# Patient Record
Sex: FEMALE | Race: OTHER | URBAN - METROPOLITAN AREA
[De-identification: names, ages, dates, MRNs, and addresses within clinical notes are randomized per-mention and may not be internally consistent; named-entity substitution may affect disease eponyms.]

---

## 2021-08-02 ENCOUNTER — WEB ENCOUNTER (OUTPATIENT)
Dept: URBAN - METROPOLITAN AREA CLINIC 118 | Facility: CLINIC | Age: 56
End: 2021-08-02

## 2021-08-04 ENCOUNTER — OFFICE VISIT (OUTPATIENT)
Dept: URBAN - METROPOLITAN AREA CLINIC 118 | Facility: CLINIC | Age: 56
End: 2021-08-04
Payer: OTHER GOVERNMENT

## 2021-08-04 ENCOUNTER — LAB OUTSIDE AN ENCOUNTER (OUTPATIENT)
Dept: URBAN - METROPOLITAN AREA CLINIC 118 | Facility: CLINIC | Age: 56
End: 2021-08-04

## 2021-08-04 DIAGNOSIS — K57.90 DIVERTICULOSIS: ICD-10-CM

## 2021-08-04 DIAGNOSIS — R10.32 LLQ ABDOMINAL PAIN: ICD-10-CM

## 2021-08-04 PROCEDURE — 99214 OFFICE O/P EST MOD 30 MIN: CPT | Performed by: INTERNAL MEDICINE

## 2021-08-04 NOTE — PHYSICAL EXAM GASTROINTESTINAL
Abdomen , soft, mild LLQ tenderness, nondistended , no guarding or rigidity , no masses palpable , normal bowel sounds , Liver and Spleen , no hepatomegaly present , no hepatosplenomegaly , liver nontender , spleen not palpable

## 2021-08-04 NOTE — HPI-TODAY'S VISIT:
The patient is referred back to our clinic by Dr. Medrano's office for left lower quadrant pain and a history of diverticular disease.  She had an uncomplicated colonoscopy in 2017 that showed mild sigmoid diverticulosis.  She has had intermittent left lower quadrant discomfort for at least the last 5 years and has been treated multiple times with oral antibiotics for presumed diverticulitis.  She says her current left lower quadrant pain is mild to moderate and she has no blood in the bowel movements which are relatively regular.  She does have a history of an exploratory laparoscopy for ovarian cysts as well as a partial hysterectomy.  She has never been hospitalized for diverticular disease, nor has she had any imaging studies such as a CT scan.  There is no family history of diverticulitis nor of colon cancer.  The patient has had no abnormal loss of weight, severe blood in the stools, nausea and vomiting, loss of appetite, or radiation of the pain from the left lower quadrant.

## 2021-08-05 ENCOUNTER — OFFICE VISIT (OUTPATIENT)
Dept: URBAN - METROPOLITAN AREA CLINIC 78 | Facility: CLINIC | Age: 56
End: 2021-08-05

## 2021-09-02 ENCOUNTER — LAB OUTSIDE AN ENCOUNTER (OUTPATIENT)
Dept: URBAN - METROPOLITAN AREA CLINIC 118 | Facility: CLINIC | Age: 56
End: 2021-09-02

## 2021-11-10 ENCOUNTER — OFFICE VISIT (OUTPATIENT)
Dept: URBAN - METROPOLITAN AREA CLINIC 118 | Facility: CLINIC | Age: 56
End: 2021-11-10

## 2021-12-06 ENCOUNTER — OFFICE VISIT (OUTPATIENT)
Dept: URBAN - METROPOLITAN AREA CLINIC 118 | Facility: CLINIC | Age: 56
End: 2021-12-06

## 2021-12-08 ENCOUNTER — OFFICE VISIT (OUTPATIENT)
Dept: URBAN - METROPOLITAN AREA CLINIC 118 | Facility: CLINIC | Age: 56
End: 2021-12-08
Payer: OTHER GOVERNMENT

## 2021-12-08 DIAGNOSIS — K57.90 DIVERTICULOSIS: ICD-10-CM

## 2021-12-08 DIAGNOSIS — K59.04 CHRONIC IDIOPATHIC CONSTIPATION: ICD-10-CM

## 2021-12-08 DIAGNOSIS — R10.32 LLQ ABDOMINAL PAIN: ICD-10-CM

## 2021-12-08 PROBLEM — 82934008: Status: ACTIVE | Noted: 2021-12-08

## 2021-12-08 PROBLEM — 397881000: Status: ACTIVE | Noted: 2021-08-04

## 2021-12-08 PROCEDURE — 99213 OFFICE O/P EST LOW 20 MIN: CPT | Performed by: INTERNAL MEDICINE

## 2021-12-08 NOTE — PHYSICAL EXAM GASTROINTESTINAL
Abdomen , soft, minimal LLQ tenderness, nondistended , no guarding or rigidity , no masses palpable , normal bowel sounds , Liver and Spleen , no hepatomegaly present , no hepatosplenomegaly , liver nontender , spleen not palpable

## 2021-12-08 NOTE — HPI-TODAY'S VISIT:
The patient is following up on her recent CT scan.  She had diverticulosis with no signs of colon inflammation.  She has had no recent fevers, chills, change in bowel habits, nausea, vomiting, severe abdominal pain, or weight loss.  Her bowel movements are regular and she is on daily fiber and magnesium pills for chronic constipation.

## 2022-05-26 ENCOUNTER — TELEPHONE ENCOUNTER (OUTPATIENT)
Dept: URBAN - METROPOLITAN AREA CLINIC 105 | Facility: CLINIC | Age: 57
End: 2022-05-26

## 2022-06-01 ENCOUNTER — OFFICE VISIT (OUTPATIENT)
Dept: URBAN - METROPOLITAN AREA CLINIC 105 | Facility: CLINIC | Age: 57
End: 2022-06-01

## 2022-08-11 ENCOUNTER — OFFICE VISIT (OUTPATIENT)
Dept: URBAN - METROPOLITAN AREA SURGERY CENTER 23 | Facility: SURGERY CENTER | Age: 57
End: 2022-08-11

## 2022-09-08 ENCOUNTER — LAB OUTSIDE AN ENCOUNTER (OUTPATIENT)
Dept: URBAN - METROPOLITAN AREA CLINIC 118 | Facility: CLINIC | Age: 57
End: 2022-09-08

## 2022-09-08 ENCOUNTER — OFFICE VISIT (OUTPATIENT)
Dept: URBAN - METROPOLITAN AREA CLINIC 118 | Facility: CLINIC | Age: 57
End: 2022-09-08
Payer: OTHER GOVERNMENT

## 2022-09-08 VITALS
WEIGHT: 210.8 LBS | DIASTOLIC BLOOD PRESSURE: 89 MMHG | SYSTOLIC BLOOD PRESSURE: 134 MMHG | HEART RATE: 75 BPM | TEMPERATURE: 98.1 F | BODY MASS INDEX: 35.12 KG/M2 | HEIGHT: 65 IN

## 2022-09-08 DIAGNOSIS — K64.8 INTERNAL HEMORRHOIDS: ICD-10-CM

## 2022-09-08 DIAGNOSIS — K64.4 EXTERNAL HEMORRHOID: ICD-10-CM

## 2022-09-08 PROCEDURE — 99213 OFFICE O/P EST LOW 20 MIN: CPT | Performed by: INTERNAL MEDICINE

## 2022-09-08 RX ORDER — SODIUM, POTASSIUM,MAG SULFATES 17.5-3.13G
TAKE AS DIRECTED, 177ML SOLUTION, RECONSTITUTED, ORAL ORAL
Qty: 1 | Refills: 0 | OUTPATIENT

## 2022-09-08 NOTE — HPI-TODAY'S VISIT:
Ms. Salazar is a 56 y/o female who presents today for hemorrhoidal banding consult.  Her last banding was done in office in [place] in 2015. She reports since then she has been experiencing increasing discomfort while having a bowel movement. She denies any hematochezia or abdominal pain. Her last colonoscopy was 06/2017 which showed internal and external hemorrhoids and diverticulosis. She would like to having banding done during flex sig.

## 2022-10-04 ENCOUNTER — OFFICE VISIT (OUTPATIENT)
Dept: URBAN - METROPOLITAN AREA MEDICAL CENTER 16 | Facility: MEDICAL CENTER | Age: 57
End: 2022-10-04
Payer: OTHER GOVERNMENT

## 2022-10-04 DIAGNOSIS — K62.89 ACUTE PROCTITIS: ICD-10-CM

## 2022-10-04 DIAGNOSIS — K62.5 ANAL BLEEDING: ICD-10-CM

## 2022-10-04 PROCEDURE — 45330 DIAGNOSTIC SIGMOIDOSCOPY: CPT | Performed by: INTERNAL MEDICINE

## 2022-11-02 ENCOUNTER — OFFICE VISIT (OUTPATIENT)
Dept: URBAN - METROPOLITAN AREA CLINIC 118 | Facility: CLINIC | Age: 57
End: 2022-11-02

## 2023-03-28 ENCOUNTER — WEB ENCOUNTER (OUTPATIENT)
Dept: URBAN - METROPOLITAN AREA CLINIC 84 | Facility: CLINIC | Age: 58
End: 2023-03-28

## 2023-03-28 ENCOUNTER — TELEPHONE ENCOUNTER (OUTPATIENT)
Dept: URBAN - METROPOLITAN AREA CLINIC 84 | Facility: CLINIC | Age: 58
End: 2023-03-28

## 2023-03-28 ENCOUNTER — OFFICE VISIT (OUTPATIENT)
Dept: URBAN - METROPOLITAN AREA CLINIC 84 | Facility: CLINIC | Age: 58
End: 2023-03-28
Payer: OTHER GOVERNMENT

## 2023-03-28 VITALS
TEMPERATURE: 97.3 F | WEIGHT: 213.8 LBS | BODY MASS INDEX: 34.36 KG/M2 | SYSTOLIC BLOOD PRESSURE: 102 MMHG | DIASTOLIC BLOOD PRESSURE: 71 MMHG | HEART RATE: 101 BPM | HEIGHT: 66 IN

## 2023-03-28 DIAGNOSIS — R14.0 BLOATING: ICD-10-CM

## 2023-03-28 DIAGNOSIS — R50.9 FEVER, UNSPECIFIED FEVER CAUSE: ICD-10-CM

## 2023-03-28 DIAGNOSIS — R10.84 GENERALIZED ABDOMINAL CRAMPING: ICD-10-CM

## 2023-03-28 PROCEDURE — 99214 OFFICE O/P EST MOD 30 MIN: CPT | Performed by: INTERNAL MEDICINE

## 2023-03-28 RX ORDER — HYOSCYAMINE SULFATE 0.12 MG/1
TAKE 1 TABLET (0.125 MG) BY ORAL ROUTE 3 TIMES PER DAY FOR 30 DAYS TABLET ORAL
Qty: 90 | Refills: 3 | OUTPATIENT
Start: 2023-03-28 | End: 2023-07-26

## 2023-03-28 RX ORDER — SODIUM, POTASSIUM,MAG SULFATES 17.5-3.13G
TAKE AS DIRECTED, 177ML SOLUTION, RECONSTITUTED, ORAL ORAL
Qty: 1 | Refills: 0 | Status: ON HOLD | COMMUNITY

## 2023-03-28 RX ORDER — CIPROFLOXACIN HYDROCHLORIDE 500 MG/1
1 TABLET TABLET, FILM COATED ORAL
Qty: 20 | OUTPATIENT
Start: 2023-03-28 | End: 2023-04-07

## 2023-03-28 NOTE — HPI-TODAY'S VISIT:
59 yo pt presents with severe epigastric pain that's now diffuse for 3 days.  She has subject chills with fever of 101.5. Pain began at night. She had mexican food at 5:50pm and sxs began suddenly at 9pm and went to work next day and noted abd distension with bloating. She denies diarrhea. She took powdered magnesium shortly prior to the abd pain.

## 2023-03-30 LAB
A/G RATIO: 1.7
ALBUMIN: 4.7
ALKALINE PHOSPHATASE: 65
ALT (SGPT): 26
AST (SGOT): 18
BASO (ABSOLUTE): 0.1
BASOS: 1
BILIRUBIN, TOTAL: 0.4
BUN/CREATININE RATIO: 15
BUN: 11
CALCIUM: 10.1
CARBON DIOXIDE, TOTAL: 25
CHLORIDE: 102
CREATININE: 0.74
EGFR: 95
EOS (ABSOLUTE): 0.1
EOS: 1
GLOBULIN, TOTAL: 2.8
GLUCOSE: 107
HEMATOCRIT: 41.6
HEMOGLOBIN: 13.6
IMMATURE GRANS (ABS): 0
IMMATURE GRANULOCYTES: 0
LYMPHS (ABSOLUTE): 2.3
LYMPHS: 34
MCH: 29.2
MCHC: 32.7
MCV: 90
MONOCYTES(ABSOLUTE): 0.4
MONOCYTES: 6
NEUTROPHILS (ABSOLUTE): 4
NEUTROPHILS: 58
PLATELETS: 326
POTASSIUM: 4.4
PROTEIN, TOTAL: 7.5
RBC: 4.65
RDW: 12.6
SODIUM: 137
WBC: 6.9

## 2023-04-05 ENCOUNTER — LAB OUTSIDE AN ENCOUNTER (OUTPATIENT)
Dept: URBAN - METROPOLITAN AREA CLINIC 84 | Facility: CLINIC | Age: 58
End: 2023-04-05

## 2023-04-14 ENCOUNTER — OFFICE VISIT (OUTPATIENT)
Dept: URBAN - METROPOLITAN AREA CLINIC 84 | Facility: CLINIC | Age: 58
End: 2023-04-14
Payer: OTHER GOVERNMENT

## 2023-04-14 VITALS
HEART RATE: 81 BPM | TEMPERATURE: 98.5 F | DIASTOLIC BLOOD PRESSURE: 77 MMHG | WEIGHT: 216.4 LBS | HEIGHT: 66 IN | SYSTOLIC BLOOD PRESSURE: 121 MMHG | BODY MASS INDEX: 34.78 KG/M2

## 2023-04-14 DIAGNOSIS — R10.32 LLQ ABDOMINAL PAIN: ICD-10-CM

## 2023-04-14 DIAGNOSIS — K57.32 SIGMOID DIVERTICULITIS: ICD-10-CM

## 2023-04-14 DIAGNOSIS — R10.84 GENERALIZED ABDOMINAL CRAMPING: ICD-10-CM

## 2023-04-14 DIAGNOSIS — R14.0 BLOATING: ICD-10-CM

## 2023-04-14 PROCEDURE — 99213 OFFICE O/P EST LOW 20 MIN: CPT | Performed by: INTERNAL MEDICINE

## 2023-04-14 RX ORDER — HYOSCYAMINE SULFATE 0.12 MG/1
TAKE 1 TABLET (0.125 MG) BY ORAL ROUTE 3 TIMES PER DAY FOR 30 DAYS TABLET ORAL
Qty: 90 | Refills: 3 | Status: ACTIVE | COMMUNITY
Start: 2023-03-28 | End: 2023-07-26

## 2023-04-14 RX ORDER — SODIUM, POTASSIUM,MAG SULFATES 17.5-3.13G
TAKE AS DIRECTED, 177ML SOLUTION, RECONSTITUTED, ORAL ORAL
Qty: 1 | Refills: 0 | Status: ON HOLD | COMMUNITY

## 2023-04-14 NOTE — HPI-TODAY'S VISIT:
57 yo pt presents for f/u of sxs of diverticulitis confirmed by CT scan showing mild sigmoid diverticulitis. She responded to empiric cipro rx'd prior to CT scan. She is currently asymptomatic.

## 2023-05-04 PROBLEM — 427910000: Status: ACTIVE | Noted: 2023-05-04

## 2023-05-30 ENCOUNTER — OFFICE VISIT (OUTPATIENT)
Dept: URBAN - METROPOLITAN AREA TELEHEALTH 2 | Facility: TELEHEALTH | Age: 58
End: 2023-05-30
Payer: OTHER GOVERNMENT

## 2023-05-30 DIAGNOSIS — K57.92 DIVERTICULITIS: ICD-10-CM

## 2023-05-30 PROBLEM — 307496006: Status: ACTIVE | Noted: 2023-05-30

## 2023-05-30 PROCEDURE — 99214 OFFICE O/P EST MOD 30 MIN: CPT | Performed by: INTERNAL MEDICINE

## 2023-05-30 RX ORDER — SODIUM, POTASSIUM,MAG SULFATES 17.5-3.13G
TAKE AS DIRECTED, 177ML SOLUTION, RECONSTITUTED, ORAL ORAL
Qty: 1 | Refills: 0 | Status: ON HOLD | COMMUNITY

## 2023-05-30 RX ORDER — HYOSCYAMINE SULFATE 0.12 MG/1
TAKE 1 TABLET (0.125 MG) BY ORAL ROUTE 3 TIMES PER DAY FOR 30 DAYS TABLET ORAL
Qty: 90 | Refills: 3 | Status: ACTIVE | COMMUNITY
Start: 2023-03-28 | End: 2023-07-26

## 2023-05-30 RX ORDER — AMOXICILLIN AND CLAVULANATE POTASSIUM 875; 125 MG/1; MG/1
1 TABLET TABLET, FILM COATED ORAL
Qty: 20 | Refills: 0 | OUTPATIENT
Start: 2023-05-30 | End: 2023-06-09

## 2023-05-30 NOTE — HPI-TODAY'S VISIT:
This is a telehealth OV to which patient has agreed to. Limitations of telehealth discussed, pt understands and agrees to proceed. Patient's @ home during this virtual OV.  57 yo pt w known diverticulosis / diverticulitis which has resolved w ab's. Today, she presents w LLQ abdominal pain w radiation to  the lower back w low grade fever, lack of energy and anorexia wo weight loss. No GIB and no urologic sxs. Sxs are similar to prior episode of diverticulitis. She has had ~ 6 episodes of sigmoid diverticulitis in past 3 1/2 years.  No cardiorespiratory sxs. No chills nor night sweats. No other complaints.

## 2023-06-05 ENCOUNTER — DASHBOARD ENCOUNTERS (OUTPATIENT)
Age: 58
End: 2023-06-05

## 2024-04-04 ENCOUNTER — OV EP (OUTPATIENT)
Dept: URBAN - METROPOLITAN AREA CLINIC 118 | Facility: CLINIC | Age: 59
End: 2024-04-04

## 2025-01-30 ENCOUNTER — OFFICE VISIT (OUTPATIENT)
Dept: URBAN - METROPOLITAN AREA CLINIC 40 | Facility: CLINIC | Age: 60
End: 2025-01-30
Payer: COMMERCIAL

## 2025-01-30 ENCOUNTER — LAB OUTSIDE AN ENCOUNTER (OUTPATIENT)
Dept: URBAN - METROPOLITAN AREA CLINIC 40 | Facility: CLINIC | Age: 60
End: 2025-01-30

## 2025-01-30 VITALS
SYSTOLIC BLOOD PRESSURE: 124 MMHG | DIASTOLIC BLOOD PRESSURE: 80 MMHG | WEIGHT: 220 LBS | HEART RATE: 84 BPM | BODY MASS INDEX: 35.36 KG/M2 | TEMPERATURE: 98.1 F | HEIGHT: 66 IN

## 2025-01-30 DIAGNOSIS — R10.32 LLQ ABDOMINAL PAIN: ICD-10-CM

## 2025-01-30 DIAGNOSIS — K57.90 DIVERTICULOSIS: ICD-10-CM

## 2025-01-30 DIAGNOSIS — K62.5 RECTAL BLEEDING: ICD-10-CM

## 2025-01-30 DIAGNOSIS — R10.84 GENERALIZED ABDOMINAL CRAMPING: ICD-10-CM

## 2025-01-30 DIAGNOSIS — K64.8 INTERNAL HEMORRHOIDS: ICD-10-CM

## 2025-01-30 PROCEDURE — 99204 OFFICE O/P NEW MOD 45 MIN: CPT | Performed by: INTERNAL MEDICINE

## 2025-01-30 PROCEDURE — 99214 OFFICE O/P EST MOD 30 MIN: CPT | Performed by: INTERNAL MEDICINE

## 2025-01-30 NOTE — HPI-TODAY'S VISIT:
-- Patient presents on referral from Dr. Yvan Mendez. -- Seen prior by Dr Stubbs and Dr. Rodriguez at [facility]. Not seen since 2023.  --History of diverticulitis 2022.  --Flex sig 2022 showed external and internal hemorrhoids.  Prior hemorrhoid banding.  -- Colonoscopy 2017 showed hemorrhoids and diverticulosis, otherwise negative including ileoscopy.   -- EGD 2012 was normal.  Biopsies normal.  -- Hydrogen breath test normal 2012.  -- Colonoscopy 2012 was normal including ileoscopy.

## 2025-03-20 ENCOUNTER — CLAIMS CREATED FROM THE CLAIM WINDOW (OUTPATIENT)
Dept: URBAN - METROPOLITAN AREA SURGERY CENTER 30 | Facility: SURGERY CENTER | Age: 60
End: 2025-03-20
Payer: COMMERCIAL

## 2025-03-20 DIAGNOSIS — K57.30 DIVERTICULOSIS OF COLON: ICD-10-CM

## 2025-03-20 DIAGNOSIS — K92.1 HEMATOCHEZIA: ICD-10-CM

## 2025-03-20 DIAGNOSIS — K64.8 OTHER HEMORRHOIDS: ICD-10-CM

## 2025-03-20 DIAGNOSIS — K64.1 GRADE II HEMORRHOIDS: ICD-10-CM

## 2025-03-20 DIAGNOSIS — Z98.0 INTESTINAL BYPASS OR ANASTOMOSIS STATUS: ICD-10-CM

## 2025-03-20 PROCEDURE — 45378 DIAGNOSTIC COLONOSCOPY: CPT | Performed by: INTERNAL MEDICINE

## 2025-03-20 PROCEDURE — 00811 ANES LWR INTST NDSC NOS: CPT | Performed by: NURSE ANESTHETIST, CERTIFIED REGISTERED

## 2025-03-20 PROCEDURE — 46221 LIGATION OF HEMORRHOID(S): CPT | Performed by: INTERNAL MEDICINE

## 2025-04-24 ENCOUNTER — OFFICE VISIT (OUTPATIENT)
Dept: URBAN - METROPOLITAN AREA CLINIC 40 | Facility: CLINIC | Age: 60
End: 2025-04-24